# Patient Record
Sex: FEMALE | Race: WHITE | URBAN - METROPOLITAN AREA
[De-identification: names, ages, dates, MRNs, and addresses within clinical notes are randomized per-mention and may not be internally consistent; named-entity substitution may affect disease eponyms.]

---

## 2021-11-19 ENCOUNTER — OFFICE VISIT (OUTPATIENT)
Dept: URBAN - METROPOLITAN AREA CLINIC 51 | Facility: CLINIC | Age: 71
End: 2021-11-19
Payer: MEDICARE

## 2021-11-19 DIAGNOSIS — H04.123 TEAR FILM INSUFFICIENCY OF BILATERAL LACRIMAL GLANDS: ICD-10-CM

## 2021-11-19 DIAGNOSIS — Z98.890 OTHER SPECIFIED POSTPROCEDURAL STATES: ICD-10-CM

## 2021-11-19 PROCEDURE — 99204 OFFICE O/P NEW MOD 45 MIN: CPT | Performed by: OPTOMETRIST

## 2021-11-19 PROCEDURE — 92134 CPTRZ OPH DX IMG PST SGM RTA: CPT | Performed by: OPTOMETRIST

## 2021-11-19 ASSESSMENT — INTRAOCULAR PRESSURE
OD: 19
OS: 17

## 2021-11-19 ASSESSMENT — VISUAL ACUITY
OS: 20/40
OD: 20/30

## 2021-11-19 ASSESSMENT — KERATOMETRY
OD: 44.26
OS: 44.05

## 2021-11-19 NOTE — IMPRESSION/PLAN
Impression: Other chronic allergic conjunctivitis: H10.45. Plan: Can use an OTC allergy drop (Ex. Pataday, Zaditor, Alaway) as needed for itching. If no improvement with drops, can apply cool compresses.

## 2021-11-19 NOTE — IMPRESSION/PLAN
Impression: Other specified postprocedural states: Z98.890. Plan: History of LASIK OU in the 1990's. Flaps clear and centered. May limit vision after cataract surgery.

## 2021-11-19 NOTE — IMPRESSION/PLAN
Impression: Vitreous membranes and strands, bilateral: H43.313. *PVD OD Plan: Retina is attached 360 degrees. Pt to RTC ASAP if increase in floaters, flashes, or curtain or veil taking away vision.

## 2021-11-19 NOTE — IMPRESSION/PLAN
Impression: Combined forms of age-related cataract, bilateral: H25.813. Plan: Discussed cataracts with patient. Discussed treatment options. Surgical treatment is recommended. Surgical risks and benefits discussed. Patient elects surgical treatment. Recommend surgery OU, OS first. 
History of LASIK OU in the 90's. Patient is candidate/interested in multifocal, toric, standard, LenSx and ORA. Aim OD: plano  Aim OS: plano Patient understands that she will need glasses for reading/ all near work following surgery and may need full time glasses for best possible vision after cataract surgery. *Per referral, Dr. Bipin Ortega for cataract surgery. *

## 2022-03-02 ENCOUNTER — OFFICE VISIT (OUTPATIENT)
Dept: URBAN - METROPOLITAN AREA CLINIC 51 | Facility: CLINIC | Age: 72
End: 2022-03-02
Payer: MEDICARE

## 2022-03-02 DIAGNOSIS — H10.45 OTHER CHRONIC ALLERGIC CONJUNCTIVITIS: ICD-10-CM

## 2022-03-02 DIAGNOSIS — H52.4 PRESBYOPIA: ICD-10-CM

## 2022-03-02 DIAGNOSIS — H25.813 COMBINED FORMS OF AGE-RELATED CATARACT, BILATERAL: Primary | ICD-10-CM

## 2022-03-02 DIAGNOSIS — H43.313 VITREOUS MEMBRANES AND STRANDS, BILATERAL: ICD-10-CM

## 2022-03-02 PROCEDURE — 99214 OFFICE O/P EST MOD 30 MIN: CPT | Performed by: OPTOMETRIST

## 2022-03-02 PROCEDURE — 92134 CPTRZ OPH DX IMG PST SGM RTA: CPT | Performed by: OPTOMETRIST

## 2022-03-02 ASSESSMENT — KERATOMETRY
OS: 44.47
OD: 44.06

## 2022-03-02 ASSESSMENT — VISUAL ACUITY
OS: 20/30
OD: 20/30

## 2022-03-02 ASSESSMENT — INTRAOCULAR PRESSURE
OD: 18
OS: 17

## 2022-03-02 NOTE — IMPRESSION/PLAN
Impression: Vitreous membranes and strands, bilateral: H43.313. *PVD OD Plan: Retina is attached 360 degrees. Patient to RTC ASAP if increase in floaters, flashes, or curtain or veil taking away vision.

## 2022-03-02 NOTE — IMPRESSION/PLAN
Impression: Tear film insufficiency of bilateral lacrimal glands: H04.123. Plan: Recommend OTC AT's at least 1-2 times a day or more OU.

## 2022-03-02 NOTE — IMPRESSION/PLAN
Impression: Presbyopia: H52.4. Plan: Discussed do not recommend updating SRx due to minimal improvement 2' cataracts. Can update SRx in the future following cataract surgery.

## 2022-03-02 NOTE — IMPRESSION/PLAN
Impression: Other specified postprocedural states: Z98.890. Plan: History of LASIK OU in the 1990's. Flaps clear and centered OU. May limit vision after cataract surgery.

## 2022-03-02 NOTE — IMPRESSION/PLAN
Impression: Other chronic allergic conjunctivitis: H10.45. Plan: Previously discussed can use an OTC allergy drop (Ex. Pataday, Zaditor, Alaway) as needed for itching. If no improvement with drops, can apply cool compresses.

## 2022-03-02 NOTE — IMPRESSION/PLAN
Impression: Combined forms of age-related cataract, bilateral: H25.813. Plan: Discussed cataracts with patient. Discussed treatment options. Surgical treatment is recommended. Surgical risks and benefits discussed. Patient elects surgical treatment. Recommend surgery OU, OS first. 
History of LASIK OU in the 90's. Patient is candidate/interested in multifocal, toric, standard, LenSx and ORA. Aim OD: [place]  Aim OS: [place] Patient understands that she will need glasses for reading/computer and all near work following surgery and may need full time glasses for best possible vision after cataract surgery. *Per referral, Dr. Carey Xiongman for cataract surgery. *

## 2022-03-18 ENCOUNTER — ADULT PHYSICAL (OUTPATIENT)
Dept: URBAN - METROPOLITAN AREA CLINIC 44 | Facility: CLINIC | Age: 72
End: 2022-03-18
Payer: MEDICARE

## 2022-03-18 DIAGNOSIS — Z01.818 ENCOUNTER FOR OTHER PREPROCEDURAL EXAMINATION: Primary | ICD-10-CM

## 2022-03-18 PROCEDURE — 99203 OFFICE O/P NEW LOW 30 MIN: CPT | Performed by: PHYSICIAN ASSISTANT

## 2022-03-21 ENCOUNTER — PRE-OPERATIVE VISIT (OUTPATIENT)
Dept: URBAN - METROPOLITAN AREA CLINIC 44 | Facility: CLINIC | Age: 72
End: 2022-03-21
Payer: MEDICARE

## 2022-03-21 DIAGNOSIS — H52.13 MYOPIA, BILATERAL: ICD-10-CM

## 2022-03-21 DIAGNOSIS — H52.203 BILATERAL ASTIGMATISM: ICD-10-CM

## 2022-03-21 PROCEDURE — 99204 OFFICE O/P NEW MOD 45 MIN: CPT | Performed by: OPHTHALMOLOGY

## 2022-03-21 ASSESSMENT — INTRAOCULAR PRESSURE
OS: 10
OD: 9

## 2022-03-21 NOTE — IMPRESSION/PLAN
Impression: Combined forms of age-related cataract, bilateral: H25.813. Plan: (( AIM  -0.25 OU: INJECTABLE OU (DEXYCU 1st choice), ORA OU , NO LRI OU: irreg festus/prior lasik   ,  Hx Of Lasik OU,)) Discussed cataract diagnosis with the patient. Appropriate testing ordered for cataract diagnosis prior to Preop. Premium options including TORIC discussed, declines, and pt understands needs for glasses after surgery Discussed cataract diagnosis with the patient. Risks and benefits of surgical treatment were discussed and understood. Patient desires  surgical treatment to OU, OD first. Both eyes examined , medically necessary due to impact in activities of daily living. After a long discussion with pt, pt understands the need of glasses after surgery for distance and near and also limitations due to Lasik or RK ( 50/50 chance of needing additional surgery such as PRK, Lasik or other corneal surgeries) Piggy Back IOL, Removal and replacement, or possibly enhancement with more corneal refractive surgery.  Discussed there is a chance of developing capsular haze after surgery, which may be corrected with laser/yag after

## 2022-03-21 NOTE — IMPRESSION/PLAN
Impression: Vitreous membranes and strands, bilateral: H43.313. Plan: Discussed signs and symptoms of retinal detachment (flashes,floaters, curtain) as precaution. Patient instructed to call or return to clinic if condition gets worse. Discussed with patient, understands this may limit vision after surgery.

## 2022-03-21 NOTE — IMPRESSION/PLAN
Impression: Other chronic allergic conjunctivitis: H10.45. Plan: cont OTC allergy drops at AT's. Discussed with patient, understands this may limit vision after surgery.

## 2022-03-21 NOTE — IMPRESSION/PLAN
Impression: Other specified postprocedural states: Z98.890. Plan: History of LASIK OU in the 1990's. Discussed with patient, understands this may limit vision after surgery. Past Corneal Refractive Surgery: It has been explained to patient that the calculation for the intraocular lens implant that I will receive during surgery is unpredictable because of my previous corneal refractive surgery and that I may require additional surgery/refractive correction at cost to patient to reach best vision.

## 2022-03-21 NOTE — IMPRESSION/PLAN
Impression: Bilateral astigmatism: H52.203. Plan: irreg topos; Discussed with patient that  will need glasses after surgery. Discussed with patient, understands this may limit vision after surgery. Also discussed unmasking of HIGH astigmatism.

## 2022-03-31 ENCOUNTER — SURGERY (OUTPATIENT)
Dept: URBAN - METROPOLITAN AREA SURGERY 19 | Facility: SURGERY | Age: 72
End: 2022-03-31
Payer: MEDICARE

## 2022-03-31 PROCEDURE — PR1CP PR1CP: CUSTOM | Performed by: OPHTHALMOLOGY

## 2022-03-31 PROCEDURE — 66984 XCAPSL CTRC RMVL W/O ECP: CPT | Performed by: OPHTHALMOLOGY

## 2022-04-01 ENCOUNTER — POST-OPERATIVE VISIT (OUTPATIENT)
Dept: URBAN - METROPOLITAN AREA CLINIC 51 | Facility: CLINIC | Age: 72
End: 2022-04-01
Payer: MEDICARE

## 2022-04-01 DIAGNOSIS — Z48.810 ENCOUNTER FOR SURGICAL AFTERCARE FOLLOWING SURGERY ON A SENSE ORGAN: Primary | ICD-10-CM

## 2022-04-01 PROCEDURE — 99024 POSTOP FOLLOW-UP VISIT: CPT | Performed by: OPTOMETRIST

## 2022-04-01 ASSESSMENT — INTRAOCULAR PRESSURE: OD: 12

## 2022-04-01 NOTE — IMPRESSION/PLAN
Impression: S/P Cataract Extraction by phacoemulsification with IOL placement; ORA OD - 1 Day. Encounter for surgical aftercare following surgery on a sense organ  Z48.810. Plan: Doing well POD1. Swelling and inflammation noted. Restrictions apply for 1 week. Advised to not poke eye to train and drain medication. Discussed with patient that vision will continue to improve as swelling and inflammation resolves. Recommend ATs at least 2-3 times per day or more OU. RTC as scheduled for 1 week post op appointment.

## 2022-04-11 ENCOUNTER — POST-OPERATIVE VISIT (OUTPATIENT)
Dept: URBAN - METROPOLITAN AREA CLINIC 51 | Facility: CLINIC | Age: 72
End: 2022-04-11
Payer: MEDICARE

## 2022-04-11 DIAGNOSIS — Z48.810 ENCOUNTER FOR SURGICAL AFTERCARE FOLLOWING SURGERY ON A SENSE ORGAN: Primary | ICD-10-CM

## 2022-04-11 PROCEDURE — 99024 POSTOP FOLLOW-UP VISIT: CPT | Performed by: OPTOMETRIST

## 2022-04-11 ASSESSMENT — VISUAL ACUITY
OD: 20/25
OS: 20/25

## 2022-04-11 ASSESSMENT — INTRAOCULAR PRESSURE
OD: 11
OS: 10

## 2022-04-11 NOTE — IMPRESSION/PLAN
Impression: S/P Cataract Extraction by phacoemulsification with IOL placement; ORA OD - 11 Days. Encounter for surgical aftercare following surgery on a sense organ  Z48.810. Plan: Pt is healing well with first eye cataract surgery and is ready to proceed with cataract surgery. 
Reviewed Dexycu position and healing time frame

## 2022-04-14 ENCOUNTER — SURGERY (OUTPATIENT)
Dept: URBAN - METROPOLITAN AREA SURGERY 19 | Facility: SURGERY | Age: 72
End: 2022-04-14
Payer: MEDICARE

## 2022-04-14 DIAGNOSIS — H25.812 COMBINED FORMS OF AGE-RELATED CATARACT, LEFT EYE: Primary | ICD-10-CM

## 2022-04-14 PROCEDURE — 66984 XCAPSL CTRC RMVL W/O ECP: CPT | Performed by: OPHTHALMOLOGY

## 2022-04-14 PROCEDURE — PR1CP PR1CP: CUSTOM | Performed by: OPHTHALMOLOGY

## 2022-04-15 ENCOUNTER — POST-OPERATIVE VISIT (OUTPATIENT)
Dept: URBAN - METROPOLITAN AREA CLINIC 51 | Facility: CLINIC | Age: 72
End: 2022-04-15
Payer: MEDICARE

## 2022-04-15 DIAGNOSIS — Z96.1 PRESENCE OF INTRAOCULAR LENS: Primary | ICD-10-CM

## 2022-04-15 PROCEDURE — 99024 POSTOP FOLLOW-UP VISIT: CPT | Performed by: OPTOMETRIST

## 2022-04-15 NOTE — IMPRESSION/PLAN
Impression: S/P Cataract Extraction by phacoemulsification with IOL placement; ORA OS - 1 Day. Presence of intraocular lens  Z96.1. Plan: Doing well POD1. Swelling and inflammation noted. Restrictions apply for 1 week. Discussed with patient that vision will continue to improve as swelling and inflammation resolves. Recommend ATs when eyes feel dry/gritty, itchy, or water. Can trial OTC readers as needed. RTC as scheduled for final post op appointment/SRx.

## 2022-05-20 ENCOUNTER — POST-OPERATIVE VISIT (OUTPATIENT)
Dept: URBAN - METROPOLITAN AREA CLINIC 51 | Facility: CLINIC | Age: 72
End: 2022-05-20
Payer: MEDICARE

## 2022-05-20 DIAGNOSIS — Z96.1 PRESENCE OF INTRAOCULAR LENS: Primary | ICD-10-CM

## 2022-05-20 PROCEDURE — 99024 POSTOP FOLLOW-UP VISIT: CPT | Performed by: OPTOMETRIST

## 2022-05-20 ASSESSMENT — INTRAOCULAR PRESSURE
OS: 10
OD: 13

## 2022-05-20 ASSESSMENT — VISUAL ACUITY
OD: 20/25
OS: 20/25

## 2022-05-20 NOTE — IMPRESSION/PLAN
Impression: S/P Cataract Extraction by phacoemulsification with IOL placement; ORA OS - 36 Days. Presence of intraocular lens  Z96.1. Plan: Healed well. Swelling and inflammation has resolved. Clear and centered IOL's OU. Recommend AT's for comfort. Will release back to Dr. Cristina Randle for SRx and ongoing care.

## 2022-12-06 ENCOUNTER — OFFICE VISIT (OUTPATIENT)
Dept: URBAN - METROPOLITAN AREA CLINIC 51 | Facility: CLINIC | Age: 72
End: 2022-12-06
Payer: MEDICARE

## 2022-12-06 DIAGNOSIS — H18.513 FUCHS' DYSTROPHY: Primary | ICD-10-CM

## 2022-12-06 DIAGNOSIS — Z98.890 OTHER SPECIFIED POSTPROCEDURAL STATES: ICD-10-CM

## 2022-12-06 DIAGNOSIS — H26.491 OTHER SECONDARY CATARACT, RIGHT EYE: ICD-10-CM

## 2022-12-06 DIAGNOSIS — Z96.1 PRESENCE OF INTRAOCULAR LENS: ICD-10-CM

## 2022-12-06 DIAGNOSIS — H43.313 VITREOUS MEMBRANES AND STRANDS, BILATERAL: ICD-10-CM

## 2022-12-06 PROCEDURE — 92134 CPTRZ OPH DX IMG PST SGM RTA: CPT | Performed by: OPTOMETRIST

## 2022-12-06 PROCEDURE — 99214 OFFICE O/P EST MOD 30 MIN: CPT | Performed by: OPTOMETRIST

## 2022-12-06 RX ORDER — PREDNISOLONE ACETATE 10 MG/ML
1 % SUSPENSION/ DROPS OPHTHALMIC
Qty: 5 | Refills: 1 | Status: ACTIVE
Start: 2022-12-06

## 2022-12-06 ASSESSMENT — INTRAOCULAR PRESSURE
OD: 12
OS: 13

## 2022-12-06 ASSESSMENT — KERATOMETRY
OD: 45.06
OS: 44.52

## 2022-12-06 ASSESSMENT — VISUAL ACUITY
OD: 20/100
OS: 20/30

## 2022-12-06 NOTE — IMPRESSION/PLAN
Impression: Other specified postprocedural states: Z98.890. Plan: History of LASIK OU in the 1990's. Flaps clear and centered OU.

## 2022-12-06 NOTE — IMPRESSION/PLAN
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 Plan: Educated patient on condition and findings OD>OS. Bilateral guttata w/ corneal edema OD only. Accounts for vision complaints. Patient has Trisha 128 ointment, however, has not yet used. Will have patient use Trisha along with Pred. If no improvement in swelling after starting drops, can consider corneal transplant if necessary in the future. Patient to contact our office if ASAP if notices changes in vision or pain. Recommend the followin) START Pred QID OD only (ERx'd to pharmacy) 2) START Trisha 128 ANNETTE QHS OD only 3) AT's for comfort RTC in 1 month for follow up

## 2022-12-06 NOTE — IMPRESSION/PLAN
Impression: Other secondary cataract, right eye: H26.491. Plan: Opacified capsule is likely having some affect on patients vision, however, do not recommend laser treatment at this time due to corneal edema 2' Fuchs'. Can consider proceeding with YAG capsulotomy in the future. RTC if notice changes in vision. Monitor.

## 2023-01-10 ENCOUNTER — OFFICE VISIT (OUTPATIENT)
Dept: URBAN - METROPOLITAN AREA CLINIC 51 | Facility: CLINIC | Age: 73
End: 2023-01-10
Payer: MEDICARE

## 2023-01-10 DIAGNOSIS — H18.513 FUCHS' DYSTROPHY: Primary | ICD-10-CM

## 2023-01-10 PROCEDURE — 99213 OFFICE O/P EST LOW 20 MIN: CPT | Performed by: OPTOMETRIST

## 2023-01-10 PROCEDURE — 76514 ECHO EXAM OF EYE THICKNESS: CPT | Performed by: OPTOMETRIST

## 2023-01-10 NOTE — IMPRESSION/PLAN
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 Plan: 1) Decrease Pred TID OD only 2) Continue Trisha 128 ANNETTE QHS OD only 3) AT's for comfort RTC 3 mos for f/u with pachs, sooner with vision worsening

## 2023-04-13 ENCOUNTER — OFFICE VISIT (OUTPATIENT)
Dept: URBAN - METROPOLITAN AREA CLINIC 51 | Facility: CLINIC | Age: 73
End: 2023-04-13
Payer: MEDICARE

## 2023-04-13 DIAGNOSIS — H18.513 FUCHS' DYSTROPHY: Primary | ICD-10-CM

## 2023-04-13 PROCEDURE — 76514 ECHO EXAM OF EYE THICKNESS: CPT | Performed by: OPTOMETRIST

## 2023-04-13 PROCEDURE — 99213 OFFICE O/P EST LOW 20 MIN: CPT | Performed by: OPTOMETRIST

## 2023-04-13 NOTE — IMPRESSION/PLAN
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
  -no significant change in pachs
  -subjectively better -objectively mild improvement, overall stable Plan: 1) Decrease Pred BID OD only, if increase in blur return to TID
2) Continue Trisha 128 ANNETTE QHS OD only 3) AT's for comfort RTC 4 mos for f/u with pachs, sooner with vision worsening

## 2023-08-15 ENCOUNTER — OFFICE VISIT (OUTPATIENT)
Dept: URBAN - METROPOLITAN AREA CLINIC 51 | Facility: CLINIC | Age: 73
End: 2023-08-15
Payer: MEDICARE

## 2023-08-15 DIAGNOSIS — H18.513 FUCHS' DYSTROPHY: Primary | ICD-10-CM

## 2023-08-15 DIAGNOSIS — H52.4 PRESBYOPIA: ICD-10-CM

## 2023-08-15 DIAGNOSIS — H26.491 OTHER SECONDARY CATARACT, RIGHT EYE: ICD-10-CM

## 2023-08-15 PROCEDURE — 99213 OFFICE O/P EST LOW 20 MIN: CPT | Performed by: OPTOMETRIST

## 2023-08-15 RX ORDER — PREDNISOLONE ACETATE 10 MG/ML
1 % SUSPENSION/ DROPS OPHTHALMIC
Qty: 5 | Refills: 5 | Status: ACTIVE
Start: 2023-08-15

## 2023-08-15 ASSESSMENT — INTRAOCULAR PRESSURE
OD: 18
OS: 17

## 2023-12-28 ENCOUNTER — OFFICE VISIT (OUTPATIENT)
Dept: URBAN - METROPOLITAN AREA CLINIC 51 | Facility: CLINIC | Age: 73
End: 2023-12-28
Payer: MEDICARE

## 2023-12-28 DIAGNOSIS — H26.491 OTHER SECONDARY CATARACT, RIGHT EYE: Primary | ICD-10-CM

## 2023-12-28 DIAGNOSIS — Z96.1 PRESENCE OF INTRAOCULAR LENS: ICD-10-CM

## 2023-12-28 DIAGNOSIS — H43.313 VITREOUS MEMBRANES AND STRANDS, BILATERAL: ICD-10-CM

## 2023-12-28 DIAGNOSIS — H18.513 ENDOTHELIAL CORNEAL DYSTROPHY, BILATERAL: ICD-10-CM

## 2023-12-28 PROCEDURE — 99214 OFFICE O/P EST MOD 30 MIN: CPT | Performed by: OPTOMETRIST

## 2023-12-28 PROCEDURE — 92134 CPTRZ OPH DX IMG PST SGM RTA: CPT | Performed by: OPTOMETRIST

## 2023-12-28 PROCEDURE — 76514 ECHO EXAM OF EYE THICKNESS: CPT | Performed by: OPTOMETRIST

## 2023-12-28 RX ORDER — PREDNISOLONE ACETATE 10 MG/ML
1 % SUSPENSION/ DROPS OPHTHALMIC
Qty: 5 | Refills: 5 | Status: ACTIVE
Start: 2023-12-28

## 2023-12-28 ASSESSMENT — INTRAOCULAR PRESSURE
OD: 10
OS: 9

## 2023-12-28 ASSESSMENT — KERATOMETRY
OS: 44.13
OD: 44.63

## 2023-12-28 ASSESSMENT — VISUAL ACUITY
OD: 20/60
OS: 20/30

## 2024-01-30 ENCOUNTER — SURGERY (OUTPATIENT)
Dept: URBAN - METROPOLITAN AREA SURGERY 19 | Facility: SURGERY | Age: 74
End: 2024-01-30
Payer: MEDICARE

## 2024-01-30 PROCEDURE — 66821 AFTER CATARACT LASER SURGERY: CPT | Performed by: OPHTHALMOLOGY
